# Patient Record
Sex: MALE | Race: WHITE | Employment: FULL TIME | URBAN - METROPOLITAN AREA
[De-identification: names, ages, dates, MRNs, and addresses within clinical notes are randomized per-mention and may not be internally consistent; named-entity substitution may affect disease eponyms.]

---

## 2017-03-29 ENCOUNTER — APPOINTMENT (OUTPATIENT)
Dept: ULTRASOUND IMAGING | Age: 42
End: 2017-03-29
Attending: EMERGENCY MEDICINE
Payer: SELF-PAY

## 2017-03-29 ENCOUNTER — HOSPITAL ENCOUNTER (EMERGENCY)
Age: 42
Discharge: HOME | End: 2017-03-29
Attending: EMERGENCY MEDICINE
Payer: SELF-PAY

## 2017-03-29 VITALS
TEMPERATURE: 98.8 F | HEART RATE: 56 BPM | HEIGHT: 72 IN | WEIGHT: 220 LBS | OXYGEN SATURATION: 100 % | SYSTOLIC BLOOD PRESSURE: 161 MMHG | BODY MASS INDEX: 29.8 KG/M2 | DIASTOLIC BLOOD PRESSURE: 75 MMHG | RESPIRATION RATE: 18 BRPM

## 2017-03-29 DIAGNOSIS — R11.2 NON-INTRACTABLE VOMITING WITH NAUSEA, UNSPECIFIED VOMITING TYPE: Primary | ICD-10-CM

## 2017-03-29 LAB
ALBUMIN SERPL BCP-MCNC: 4.6 G/DL (ref 3.5–5)
ALBUMIN/GLOB SERPL: 1.5 {RATIO} (ref 1.1–2.2)
ALP SERPL-CCNC: 64 U/L (ref 45–117)
ALT SERPL-CCNC: 28 U/L (ref 12–78)
ANION GAP BLD CALC-SCNC: 10 MMOL/L (ref 5–15)
AST SERPL W P-5'-P-CCNC: 16 U/L (ref 15–37)
BASOPHILS # BLD AUTO: 0 K/UL (ref 0–0.1)
BASOPHILS # BLD: 0 % (ref 0–1)
BILIRUB SERPL-MCNC: 0.8 MG/DL (ref 0.2–1)
BUN SERPL-MCNC: 21 MG/DL (ref 6–20)
BUN/CREAT SERPL: 18 (ref 12–20)
CALCIUM SERPL-MCNC: 9.4 MG/DL (ref 8.5–10.1)
CHLORIDE SERPL-SCNC: 105 MMOL/L (ref 97–108)
CO2 SERPL-SCNC: 26 MMOL/L (ref 21–32)
CREAT SERPL-MCNC: 1.18 MG/DL (ref 0.7–1.3)
EOSINOPHIL # BLD: 0 K/UL (ref 0–0.4)
EOSINOPHIL NFR BLD: 0 % (ref 0–7)
ERYTHROCYTE [DISTWIDTH] IN BLOOD BY AUTOMATED COUNT: 12.5 % (ref 11.5–14.5)
GLOBULIN SER CALC-MCNC: 3.1 G/DL (ref 2–4)
GLUCOSE SERPL-MCNC: 137 MG/DL (ref 65–100)
HCT VFR BLD AUTO: 44.9 % (ref 36.6–50.3)
HGB BLD-MCNC: 15.3 G/DL (ref 12.1–17)
LIPASE SERPL-CCNC: 142 U/L (ref 73–393)
LYMPHOCYTES # BLD AUTO: 10 % (ref 12–49)
LYMPHOCYTES # BLD: 1 K/UL (ref 0.8–3.5)
MCH RBC QN AUTO: 30.5 PG (ref 26–34)
MCHC RBC AUTO-ENTMCNC: 34.1 G/DL (ref 30–36.5)
MCV RBC AUTO: 89.4 FL (ref 80–99)
MONOCYTES # BLD: 0.3 K/UL (ref 0–1)
MONOCYTES NFR BLD AUTO: 3 % (ref 5–13)
NEUTS SEG # BLD: 8.5 K/UL (ref 1.8–8)
NEUTS SEG NFR BLD AUTO: 87 % (ref 32–75)
PLATELET # BLD AUTO: 194 K/UL (ref 150–400)
POTASSIUM SERPL-SCNC: 4 MMOL/L (ref 3.5–5.1)
PROT SERPL-MCNC: 7.7 G/DL (ref 6.4–8.2)
RBC # BLD AUTO: 5.02 M/UL (ref 4.1–5.7)
SODIUM SERPL-SCNC: 141 MMOL/L (ref 136–145)
WBC # BLD AUTO: 9.8 K/UL (ref 4.1–11.1)

## 2017-03-29 PROCEDURE — 74011000250 HC RX REV CODE- 250: Performed by: EMERGENCY MEDICINE

## 2017-03-29 PROCEDURE — 36415 COLL VENOUS BLD VENIPUNCTURE: CPT | Performed by: EMERGENCY MEDICINE

## 2017-03-29 PROCEDURE — 74011250637 HC RX REV CODE- 250/637: Performed by: EMERGENCY MEDICINE

## 2017-03-29 PROCEDURE — 96361 HYDRATE IV INFUSION ADD-ON: CPT

## 2017-03-29 PROCEDURE — 74011250636 HC RX REV CODE- 250/636: Performed by: EMERGENCY MEDICINE

## 2017-03-29 PROCEDURE — 85025 COMPLETE CBC W/AUTO DIFF WBC: CPT | Performed by: EMERGENCY MEDICINE

## 2017-03-29 PROCEDURE — 80053 COMPREHEN METABOLIC PANEL: CPT | Performed by: EMERGENCY MEDICINE

## 2017-03-29 PROCEDURE — 83690 ASSAY OF LIPASE: CPT | Performed by: EMERGENCY MEDICINE

## 2017-03-29 PROCEDURE — 76705 ECHO EXAM OF ABDOMEN: CPT

## 2017-03-29 PROCEDURE — 99283 EMERGENCY DEPT VISIT LOW MDM: CPT

## 2017-03-29 PROCEDURE — 96374 THER/PROPH/DIAG INJ IV PUSH: CPT

## 2017-03-29 RX ORDER — FAMOTIDINE 20 MG/1
20 TABLET, FILM COATED ORAL 2 TIMES DAILY
Qty: 20 TAB | Refills: 0 | Status: SHIPPED | OUTPATIENT
Start: 2017-03-29 | End: 2017-05-25

## 2017-03-29 RX ORDER — MAG HYDROX/ALUMINUM HYD/SIMETH 200-200-20
30 SUSPENSION, ORAL (FINAL DOSE FORM) ORAL
Qty: 354 ML | Refills: 0 | Status: SHIPPED | OUTPATIENT
Start: 2017-03-29

## 2017-03-29 RX ORDER — ONDANSETRON 4 MG/1
4 TABLET, FILM COATED ORAL
Qty: 20 TAB | Refills: 0 | Status: SHIPPED | OUTPATIENT
Start: 2017-03-29 | End: 2017-03-29

## 2017-03-29 RX ORDER — ONDANSETRON 4 MG/1
4 TABLET, FILM COATED ORAL
Qty: 20 TAB | Refills: 0 | Status: SHIPPED | OUTPATIENT
Start: 2017-03-29 | End: 2017-05-25

## 2017-03-29 RX ORDER — MAG HYDROX/ALUMINUM HYD/SIMETH 200-200-20
30 SUSPENSION, ORAL (FINAL DOSE FORM) ORAL
Qty: 354 ML | Refills: 0 | Status: SHIPPED | OUTPATIENT
Start: 2017-03-29 | End: 2017-03-29

## 2017-03-29 RX ORDER — ONDANSETRON 2 MG/ML
4 INJECTION INTRAMUSCULAR; INTRAVENOUS
Status: COMPLETED | OUTPATIENT
Start: 2017-03-29 | End: 2017-03-29

## 2017-03-29 RX ADMIN — ONDANSETRON HYDROCHLORIDE 4 MG: 2 INJECTION, SOLUTION INTRAMUSCULAR; INTRAVENOUS at 10:07

## 2017-03-29 RX ADMIN — LIDOCAINE HYDROCHLORIDE 40 ML: 20 SOLUTION ORAL; TOPICAL at 10:07

## 2017-03-29 RX ADMIN — SODIUM CHLORIDE 1000 ML: 900 INJECTION, SOLUTION INTRAVENOUS at 10:07

## 2017-03-29 NOTE — PROGRESS NOTES
RRAT:4    CM met pt at bedside to conduct initial assessment. Pt wife was present at bedside. Pt came into ED with c/o nausea and vomiting. CM confirmed pt's address and phone number with pt. Pt is employed at [employer]. Pt reports living with wife. Pt has transportation at discharge. Pt reported to just moving so he has not set up a pharmacy where he picks up his medications from. Pt and pt's wife were receptive to [facility] application and PCP list. Pt is self pay. CM encourages the pt to call the financial aid number first to get set up with [facility]. Pt and pt's wife do not have any concerns at this time. Care Management Interventions  PCP Verified by CM: Yes  Mode of Transport at Discharge: Self  Transition of Care Consult (CM Consult): Discharge Planning  Physical Therapy Consult: No  Occupational Therapy Consult: No  Speech Therapy Consult: No  Current Support Network:  Other (Pt reports living with \"my wife\")  Confirm Follow Up Transport: Self  Plan discussed with Pt/Family/Caregiver: Yes  Discharge Location  Discharge Placement: Home with outpatient services       [facility]

## 2017-03-29 NOTE — DISCHARGE INSTRUCTIONS
Local Primary Care Physicians  [facility] [phone]  MD Barbie England MD Merita Burke, MD [facility] [phone]  Alexis Favre, [facility]  MD John Schwab MD Adelina Dotter, MD [address]  [phone]  MD Drea Fontanez MD [address] [facility] [phone]  Claude Combe, MD Wicho Russell, MD Sahara Peñaloza MD   [facility] [phone]  [facility] JULIANO BUTLER, MD Danyell Pacheco, MD Melanie Luo, NP [address] [phone]  MD Alejandra Lind MD Gregor Jain, MD Paulina Goode, MD Tobi Carson, MD Lakesha Oropeaz MD   33 57 [facility]  Cat Mccurdy MD [facility] [phone]  MD Lolis Meneses, NP  Natalia Zapata, MD Erroll Gaucher, MD Cinthya Ventura, MD Suresh Mcclure MD   69[address] [facility] [phone]  MD Alexia Reyna, FNP  Michaelene Quiet, NP  Elana See, MD Ebony Martines MD Zelda Haggard, MD [facility] [phone]  MD Jaxon Melgar MD Rosanne Shropshire, MD Jorge A Chen MD   [address] [phone]  MD Meggan Manrique MD [place] [phone]  MD Pedro Pablo Herrera MD Donette Beards, MD   [facility] [phone]  Chrystal Paget, MD Padma Soria, MD Caren Bateman, MD Radu Pérez, MD Guillermina Walker, MD Zohreh Espino, NP  Ana Paula Burr MD [address]   [phone]  MD Minh Villaseñor, MD Sunil Reddy MD   [address] [phone]  Miky Elliott, MD Elizabeth Moreno, STEPHANIE Amaro, KATLYN Amaro, STEPHANIE Menjivar, MD Lyubov Del Rosario NP Vivi Hands, DO             Miscellaneous:  Silver Muñoz MD [facility] Dep[facility]     For adult and child immunizations, family planning, TB screening, STD testing and women's health services. Isaac: Doyle [phone]      [employer] [address]   [address]   [address]   170 [facility]: [facility] 200 [facility] [phone]      [address] [facility]          [address] [address]     For primary care services, woman and child wellness, and some clinics providing specialty care. [facility] [address] [facility] [phone]   [facility] 200 [facility] [phone]   339 [facility] [address] [phone] [address] [facility] [address] [place] [address] [facility] [phone]   7708 [facility] [phone]   [facility] 81 [facility] [phone]   [facility] [address] [place] [address] [phone]   [facility]: [facility] [address], [facility] 79 [facility] [phone]     Taminatalya Diallo [address] [facility] [address] [address] [phone]   [facility] [address] [facility] [phone]   [employer]  [address], [address] ([email or web address]) [phone]         Sexual Health/Woman Wellness Clinics    For STD/HIV testing and treatment, pregnancy testing and services, men's health, birth control services, LGBT services, and hepatitis/HPV vaccine services. [employer] [address] [facility] 15[address]  [address] [phone]   [address] [place] [address] Rd, 5th floor [phone]   [facility] [place] [facility] 59 [facility] for Women [address] [phone]         [place] [address] [facility] [address]   [address] [place] [address]   [phone]   Women, Infant and Children's Services: [address] [phone]       [address] [phone]   [facility]   [phone]   [address] 9964 [facility] Psychiat[facility]     [phone]   [address] Crisis   [phone]   [facility] [phone]          Nausea and Vomiting: Care Instructions  Your Care Instructions    When you are nauseated, you may feel weak and sweaty and notice a lot of saliva in your mouth. Nausea often leads to vomiting. Most of the time you do not need to worry about nausea and vomiting, but they can be signs of other illnesses. Two common causes of nausea and vomiting are stomach flu and food poisoning. Nausea and vomiting from viral stomach flu will usually start to improve within 24 hours. Nausea and vomiting from food poisoning may last from 12 to 48 hours. The doctor has checked you carefully, but problems can develop later. If you notice any problems or new symptoms, get medical treatment right away. Follow-up care is a key part of your treatment and safety. Be sure to make and go to all appointments, and call your doctor if you are having problems. It's also a good idea to know your test results and keep a list of the medicines you take. How can you care for yourself at home? · To prevent dehydration, drink plenty of fluids, enough so that your urine is light yellow or clear like water. Choose water and other caffeine-free clear liquids until you feel better.  If you have kidney, heart, or liver disease and have to limit fluids, talk with your doctor before you increase the amount of fluids you drink.  · Rest in bed until you feel better. · When you are able to eat, try clear soups, mild foods, and liquids until all symptoms are gone for 12 to 48 hours. Other good choices include dry toast, crackers, cooked cereal, and gelatin dessert, such as Jell-O. When should you call for help? Call 911 anytime you think you may need emergency care. For example, call if:  · You passed out (lost consciousness). Call your doctor now or seek immediate medical care if:  · You have symptoms of dehydration, such as:  ¨ Dry eyes and a dry mouth. ¨ Passing only a little dark urine. ¨ Feeling thirstier than usual.  · You have new or worsening belly pain. · You have a new or higher fever. · You vomit blood or what looks like coffee grounds. Watch closely for changes in your health, and be sure to contact your doctor if:  · You have ongoing nausea and vomiting. · Your vomiting is getting worse. · Your vomiting lasts longer than 2 days. · You are not getting better as expected. Where can you learn more? Go to http://zohra-jim.info/. Enter 25 [id number] in the search box to learn more about \"Nausea and Vomiting: Care Instructions. \"  Current as of: May 27, 2016  Content Version: 11.2  © 0258-1217 [employer]. Care instructions adapted under license by [employer] (which disclaims liability or warranty for this information). If you have questions about a medical condition or this instruction, always ask your healthcare professional. [place] [address] any warranty or liability for your use of this information.

## 2017-03-29 NOTE — ED PROVIDER NOTES
HPI Comments: Riya Yanes, 37 y.o. Male with PMHx significant for GERD, presents ambulatory to [id number] [place] [address] ED with cc of 7/10 progressively worsening cramping non-radiating upper abdominal pain x 2 days. The patient also c/o associated nausea and vomiting. The patient notes that he has been unable to tolerate PO. The patient states that he has a history of similar symptoms, and he was evaluated in the ED a few months ago and had a CT with no significant findings. The patient also notes that he was evaluated by GI with no significant findings. The pt states that he takes Nexium daily, which has provided no relief. The patient states that he has had recent sick contact. The patient denies a history of an EGD, taking medications PTA, recently eating spicy foods, recent dietary changes, or history of an ultrasound. The patient specifically denies constipation, diarrhea, fevers, rhinorrhea, eye drainage, chest pain, SOB, cough, or generalized body aches at this time. PCP: None    Social history significant for: - Tobacco, + EtOH, - Illicit Drug Use    There are no other complaints, changes, or physical findings at this time. Written by BRIAN Garcia, as dictated by Meaghan Anglin MD.      The history is provided by the patient. No  was used. Past Medical History:   Diagnosis Date    GERD (gastroesophageal reflux disease)        No past surgical history on file. No family history on file. Social History     Social History    Marital status:      Spouse name: N/A    Number of children: N/A    Years of education: N/A     Occupational History    Not on file.      Social History Main Topics    Smoking status: Never Smoker    Smokeless tobacco: Not on file    Alcohol use Yes      Comment: socially    Drug use: No    Sexual activity: Yes     Partners: Female     Other Topics Concern    Not on file     Social History Narrative         ALLERGIES: Review of patient's allergies indicates no known allergies. Review of Systems   Constitutional: Negative for chills and fever. HENT: Negative for rhinorrhea. Eyes: Negative for discharge. Respiratory: Negative for cough and shortness of breath. Cardiovascular: Negative for chest pain. Gastrointestinal: Positive for abdominal pain, nausea and vomiting. Negative for constipation and diarrhea. Musculoskeletal: Negative for myalgias. Neurological: Negative for weakness and numbness. All other systems reviewed and are negative. Vitals:    03/29/17 0938 03/29/17 0939 03/29/17 1253   BP: 161/75 161/75    Pulse:  (!) 56    Resp:  18    Temp:  98.8 °F (37.1 °C)    SpO2:  100% 100%   Weight:  99.8 kg (220 lb)    Height:  6' (1.829 m)             Physical Exam   Constitutional: He is oriented to person, place, and time. He appears well-developed and well-nourished. HENT:   Head: Normocephalic and atraumatic. Eyes: Conjunctivae and EOM are normal.   Neck: Normal range of motion. Neck supple. Cardiovascular: Normal rate and regular rhythm. Pulmonary/Chest: Effort normal and breath sounds normal. No respiratory distress. Abdominal: Soft. He exhibits no distension. There is tenderness in the epigastric area. Musculoskeletal: Normal range of motion. Neurological: He is alert and oriented to person, place, and time. Skin: Skin is warm and dry. Psychiatric: He has a normal mood and affect. Nursing note and vitals reviewed. Written by BRIAN Bonilla, as dictated by Eirc Braga MD.    MDM  Number of Diagnoses or Management Options  Non-intractable vomiting with nausea, unspecified vomiting type:   Diagnosis management comments: Patient presents with epigastric abdominal pain. Differential includes gastritis, pancreatitis cholelithiasis, cholecystitis, hepatitis, muscular strain, renal pathology, gastroenteritis. Less likely ACS. Will obtain labs and possibly US.  Will give fluids, analgesics and antiemetics PRN. Amount and/or Complexity of Data Reviewed  Clinical lab tests: ordered and reviewed  Tests in the radiology section of CPT®: ordered and reviewed  Review and summarize past medical records: yes    Patient Progress  Patient progress: stable    ED Course       Procedures     Progress Note:  11:33 AM  The patient was re-evaluated and reports that his nausea has completely resolved. Written by BRIAN Martinez, as dictated by Dori Vang MD.    Progress Note:  12:00 PM  The patient was updated on his lab results. Informed the patient that he will be receiving an ultrasound, and he is in agreement with the plan. Written by BRIAN Martinez, as dictated by Dori Vang MD.    Progress Note:  12:50 PM  The patient's imaging results have been discussed with him, and he conveys his understanding. Will discharge with follow up with GI. Written by BRIAN Martinez, as dictated by Dori Vang MD.    LABORATORY TESTS:  Recent Results (from the past 12 hour(s))   CBC WITH AUTOMATED DIFF    Collection Time: 03/29/17  9:52 AM   Result Value Ref Range    WBC 9.8 4.1 - 11.1 K/uL    RBC 5.02 4.10 - 5.70 M/uL    HGB 15.3 12.1 - 17.0 g/dL    HCT 44.9 36.6 - 50.3 %    MCV 89.4 80.0 - 99.0 FL    MCH 30.5 26.0 - 34.0 PG    MCHC 34.1 30.0 - 36.5 g/dL    RDW 12.5 11.5 - 14.5 %    PLATELET 794 749 - 963 K/uL    NEUTROPHILS 87 (H) 32 - 75 %    LYMPHOCYTES 10 (L) 12 - 49 %    MONOCYTES 3 (L) 5 - 13 %    EOSINOPHILS 0 0 - 7 %    BASOPHILS 0 0 - 1 %    ABS. NEUTROPHILS 8.5 (H) 1.8 - 8.0 K/UL    ABS. LYMPHOCYTES 1.0 0.8 - 3.5 K/UL    ABS. MONOCYTES 0.3 0.0 - 1.0 K/UL    ABS. EOSINOPHILS 0.0 0.0 - 0.4 K/UL    ABS.  BASOPHILS 0.0 0.0 - 0.1 K/UL   LIPASE    Collection Time: 03/29/17  9:52 AM   Result Value Ref Range    Lipase 142 73 - 720 U/L   METABOLIC PANEL, COMPREHENSIVE    Collection Time: 03/29/17  9:52 AM   Result Value Ref Range    Sodium 141 136 - 145 mmol/L    Potassium 4.0 3.5 - 5.1 mmol/L    Chloride 105 97 - 108 mmol/L    CO2 26 21 - 32 mmol/L    Anion gap 10 5 - 15 mmol/L    Glucose 137 (H) 65 - 100 mg/dL    BUN 21 (H) 6 - 20 MG/DL    Creatinine 1.18 0.70 - 1.30 MG/DL    BUN/Creatinine ratio 18 12 - 20      GFR est AA >60 >60 ml/min/1.73m2    GFR est non-AA >60 >60 ml/min/1.73m2    Calcium 9.4 8.5 - 10.1 MG/DL    Bilirubin, total 0.8 0.2 - 1.0 MG/DL    ALT (SGPT) 28 12 - 78 U/L    AST (SGOT) 16 15 - 37 U/L    Alk. phosphatase 64 45 - 117 U/L    Protein, total 7.7 6.4 - 8.2 g/dL    Albumin 4.6 3.5 - 5.0 g/dL    Globulin 3.1 2.0 - 4.0 g/dL    A-G Ratio 1.5 1.1 - 2.2       IMAGING RESULTS:  US ABD LTD:    Indication: Right upper quadrant pain     Real-time sonographic imaging of the right upper quadrant was performed. The  liver is normal in appearance without evidence of mass lesion or biliary  dilatation. The gallbladder is unremarkable. Common bile duct is normal in size. The right kidney is normal in size and appearance without evidence of mass  lesion, hydronephrosis, or calcification. The visualized portion of the head and  body of the pancreas is unremarkable. No free fluid.     IMPRESSION  Impression: Normal exam.    MEDICATIONS GIVEN:  Medications   ondansetron (ZOFRAN) injection 4 mg (4 mg IntraVENous Given 3/29/17 1007)   sodium chloride 0.9 % bolus infusion 1,000 mL (1,000 mL IntraVENous New Bag 3/29/17 1007)   mylanta/viscous lidocaine (NATALIA)(GI COCKTAIL) (40 mL Oral Given 3/29/17 1007)       IMPRESSION:  1. Non-intractable vomiting with nausea, unspecified vomiting type        PLAN:  1. Current Discharge Medication List      START taking these medications    Details   alum-mag hydroxide-simeth (MAALOX ADVANCED) 200-200-20 mg/5 mL susp Take 30 mL by mouth four (4) times daily as needed. Qty: 354 mL, Refills: 0      famotidine (PEPCID) 20 mg tablet Take 1 Tab by mouth two (2) times a day.   Qty: 20 Tab, Refills: 0         CONTINUE these medications which have CHANGED Details   ondansetron hcl (ZOFRAN, AS HYDROCHLORIDE,) 4 mg tablet Take 1 Tab by mouth every eight (8) hours as needed for Nausea. Qty: 20 Tab, Refills: 0           2. Follow-up Information     Follow up With Details Comments Contact Info    [facility] EMERGENCY DEPT  If symptoms worsen [address] [place] [address]  [phone]        Return to ED if worse     DISCHARGE NOTE  12:37 PM  The patient has been re-evaluated and is ready for discharge. Reviewed available results with patient. Counseled patient on diagnosis and care plan. Patient has expressed understanding, and all questions have been answered. Patient agrees with plan and agrees to follow up as recommended, or return to the ED if their symptoms worsen. Discharge instructions have been provided and explained to the patient, along with reasons to return to the ED. ATTESTATION:  This note is prepared by Lucas Baum, acting as Scribe for Sharyn Richards M.D. Sharyn Richards M.D: The scribe's documentation has been prepared under my direction and personally reviewed by me in its entirety. I confirm that the note above accurately reflects all work, treatment, procedures, and medical decision making performed by me.

## 2017-03-29 NOTE — ROUTINE PROCESS
The ED Physician reviewed discharge instructions with the patient. The patient verbalized understanding.

## 2017-05-20 ENCOUNTER — HOSPITAL ENCOUNTER (EMERGENCY)
Age: 42
Discharge: HOME | End: 2017-05-20
Attending: EMERGENCY MEDICINE
Payer: SELF-PAY

## 2017-05-20 ENCOUNTER — APPOINTMENT (OUTPATIENT)
Dept: GENERAL RADIOLOGY | Age: 42
End: 2017-05-20
Attending: EMERGENCY MEDICINE
Payer: SELF-PAY

## 2017-05-20 VITALS
HEIGHT: 72 IN | RESPIRATION RATE: 19 BRPM | BODY MASS INDEX: 30 KG/M2 | SYSTOLIC BLOOD PRESSURE: 146 MMHG | TEMPERATURE: 98.7 F | WEIGHT: 221.5 LBS | HEART RATE: 55 BPM | OXYGEN SATURATION: 98 % | DIASTOLIC BLOOD PRESSURE: 74 MMHG

## 2017-05-20 DIAGNOSIS — K31.84 GASTROPARESIS: Primary | ICD-10-CM

## 2017-05-20 LAB
ALBUMIN SERPL BCP-MCNC: 4.2 G/DL (ref 3.5–5)
ALBUMIN/GLOB SERPL: 1.3 {RATIO} (ref 1.1–2.2)
ALP SERPL-CCNC: 62 U/L (ref 45–117)
ALT SERPL-CCNC: 21 U/L (ref 12–78)
AMORPH CRY URNS QL MICRO: ABNORMAL
ANION GAP BLD CALC-SCNC: 7 MMOL/L (ref 5–15)
APPEARANCE UR: ABNORMAL
AST SERPL W P-5'-P-CCNC: 13 U/L (ref 15–37)
BACTERIA URNS QL MICRO: NEGATIVE /HPF
BASOPHILS # BLD AUTO: 0 K/UL (ref 0–0.1)
BASOPHILS # BLD: 0 % (ref 0–1)
BILIRUB SERPL-MCNC: 0.6 MG/DL (ref 0.2–1)
BILIRUB UR QL: NEGATIVE
BUN SERPL-MCNC: 14 MG/DL (ref 6–20)
BUN/CREAT SERPL: 13 (ref 12–20)
CALCIUM SERPL-MCNC: 9.4 MG/DL (ref 8.5–10.1)
CHLORIDE SERPL-SCNC: 108 MMOL/L (ref 97–108)
CO2 SERPL-SCNC: 24 MMOL/L (ref 21–32)
COLOR UR: ABNORMAL
CREAT SERPL-MCNC: 1.1 MG/DL (ref 0.7–1.3)
DIFFERENTIAL METHOD BLD: ABNORMAL
EOSINOPHIL # BLD: 0 K/UL (ref 0–0.4)
EOSINOPHIL NFR BLD: 0 % (ref 0–7)
EPITH CASTS URNS QL MICRO: ABNORMAL /LPF
ERYTHROCYTE [DISTWIDTH] IN BLOOD BY AUTOMATED COUNT: 12.2 % (ref 11.5–14.5)
GLOBULIN SER CALC-MCNC: 3.2 G/DL (ref 2–4)
GLUCOSE SERPL-MCNC: 150 MG/DL (ref 65–100)
GLUCOSE UR STRIP.AUTO-MCNC: NEGATIVE MG/DL
HCT VFR BLD AUTO: 44.7 % (ref 36.6–50.3)
HGB BLD-MCNC: 15.5 G/DL (ref 12.1–17)
HGB UR QL STRIP: NEGATIVE
KETONES UR QL STRIP.AUTO: 40 MG/DL
LEUKOCYTE ESTERASE UR QL STRIP.AUTO: NEGATIVE
LIPASE SERPL-CCNC: 117 U/L (ref 73–393)
LYMPHOCYTES # BLD AUTO: 9 % (ref 12–49)
LYMPHOCYTES # BLD: 0.7 K/UL (ref 0.8–3.5)
MCH RBC QN AUTO: 30.6 PG (ref 26–34)
MCHC RBC AUTO-ENTMCNC: 34.7 G/DL (ref 30–36.5)
MCV RBC AUTO: 88.3 FL (ref 80–99)
MONOCYTES # BLD: 0.2 K/UL (ref 0–1)
MONOCYTES NFR BLD AUTO: 2 % (ref 5–13)
NEUTS SEG # BLD: 6.8 K/UL (ref 1.8–8)
NEUTS SEG NFR BLD AUTO: 89 % (ref 32–75)
NITRITE UR QL STRIP.AUTO: NEGATIVE
PH UR STRIP: 8 [PH] (ref 5–8)
PLATELET # BLD AUTO: 212 K/UL (ref 150–400)
POTASSIUM SERPL-SCNC: 4.1 MMOL/L (ref 3.5–5.1)
PROT SERPL-MCNC: 7.4 G/DL (ref 6.4–8.2)
PROT UR STRIP-MCNC: ABNORMAL MG/DL
RBC # BLD AUTO: 5.06 M/UL (ref 4.1–5.7)
RBC #/AREA URNS HPF: ABNORMAL /HPF (ref 0–5)
RBC MORPH BLD: ABNORMAL
RBC MORPH BLD: ABNORMAL
SODIUM SERPL-SCNC: 139 MMOL/L (ref 136–145)
SP GR UR REFRACTOMETRY: 1.03 (ref 1–1.03)
UA: UC IF INDICATED,UAUC: ABNORMAL
UROBILINOGEN UR QL STRIP.AUTO: 0.2 EU/DL (ref 0.2–1)
WBC # BLD AUTO: 7.7 K/UL (ref 4.1–11.1)
WBC URNS QL MICRO: ABNORMAL /HPF (ref 0–4)

## 2017-05-20 PROCEDURE — 85025 COMPLETE CBC W/AUTO DIFF WBC: CPT | Performed by: EMERGENCY MEDICINE

## 2017-05-20 PROCEDURE — 96374 THER/PROPH/DIAG INJ IV PUSH: CPT

## 2017-05-20 PROCEDURE — 81001 URINALYSIS AUTO W/SCOPE: CPT | Performed by: EMERGENCY MEDICINE

## 2017-05-20 PROCEDURE — 74011250636 HC RX REV CODE- 250/636: Performed by: EMERGENCY MEDICINE

## 2017-05-20 PROCEDURE — C9113 INJ PANTOPRAZOLE SODIUM, VIA: HCPCS | Performed by: EMERGENCY MEDICINE

## 2017-05-20 PROCEDURE — 36415 COLL VENOUS BLD VENIPUNCTURE: CPT | Performed by: EMERGENCY MEDICINE

## 2017-05-20 PROCEDURE — 96361 HYDRATE IV INFUSION ADD-ON: CPT

## 2017-05-20 PROCEDURE — 83690 ASSAY OF LIPASE: CPT | Performed by: EMERGENCY MEDICINE

## 2017-05-20 PROCEDURE — 96375 TX/PRO/DX INJ NEW DRUG ADDON: CPT

## 2017-05-20 PROCEDURE — 74022 RADEX COMPL AQT ABD SERIES: CPT

## 2017-05-20 PROCEDURE — 80053 COMPREHEN METABOLIC PANEL: CPT | Performed by: EMERGENCY MEDICINE

## 2017-05-20 PROCEDURE — 99285 EMERGENCY DEPT VISIT HI MDM: CPT

## 2017-05-20 RX ORDER — DICYCLOMINE HYDROCHLORIDE 20 MG/1
20 TABLET ORAL EVERY 6 HOURS
Qty: 20 TAB | Refills: 0 | Status: SHIPPED | OUTPATIENT
Start: 2017-05-20 | End: 2017-05-25

## 2017-05-20 RX ORDER — METOCLOPRAMIDE 10 MG/1
10 TABLET ORAL
Qty: 12 TAB | Refills: 0 | Status: SHIPPED | OUTPATIENT
Start: 2017-05-20 | End: 2017-05-20

## 2017-05-20 RX ORDER — ONDANSETRON 2 MG/ML
8 INJECTION INTRAMUSCULAR; INTRAVENOUS
Status: COMPLETED | OUTPATIENT
Start: 2017-05-20 | End: 2017-05-20

## 2017-05-20 RX ORDER — METOCLOPRAMIDE 10 MG/1
10 TABLET ORAL
Qty: 12 TAB | Refills: 0 | Status: SHIPPED | OUTPATIENT
Start: 2017-05-20 | End: 2017-05-25

## 2017-05-20 RX ORDER — PANTOPRAZOLE SODIUM 40 MG/10ML
40 INJECTION, POWDER, LYOPHILIZED, FOR SOLUTION INTRAVENOUS
Status: COMPLETED | OUTPATIENT
Start: 2017-05-20 | End: 2017-05-20

## 2017-05-20 RX ORDER — DICYCLOMINE HYDROCHLORIDE 20 MG/1
20 TABLET ORAL EVERY 6 HOURS
Qty: 20 TAB | Refills: 0 | Status: SHIPPED | OUTPATIENT
Start: 2017-05-20 | End: 2017-05-20

## 2017-05-20 RX ADMIN — ONDANSETRON 8 MG: 2 INJECTION INTRAMUSCULAR; INTRAVENOUS at 13:34

## 2017-05-20 RX ADMIN — SODIUM CHLORIDE 1000 ML: 900 INJECTION, SOLUTION INTRAVENOUS at 13:34

## 2017-05-20 RX ADMIN — PANTOPRAZOLE SODIUM 40 MG: 40 INJECTION, POWDER, FOR SOLUTION INTRAVENOUS at 14:20

## 2017-05-20 NOTE — ED TRIAGE NOTES
Pt presents with complaints of abdominal pain, nausea/vomiting since he woke this morning. Pt states that he has had these symptoms before and was diagnosed with reflux and put on nexium which has not helped him. Pt heaving during triage assessment.

## 2017-05-20 NOTE — DISCHARGE INSTRUCTIONS
We hope that we have addressed all of your medical concerns. The examination and treatment you received in the Emergency Department were for an emergent problem and were not intended as complete care. It is important that you follow up with your healthcare provider(s) for ongoing care. If your symptoms worsen or do not improve as expected, and you are unable to reach your usual health care provider(s), you should return to the Emergency Department. Today's healthcare is undergoing tremendous change, and patient satisfaction surveys are one of the many tools to assess the quality of medical care. You may receive a survey from the [employer] regarding your experience in the Emergency Department. I hope that your experience has been completely positive, particularly the medical care that I provided. As such, please participate in the survey; anything less than excellent does not meet my expectations or intentions. [facility] and [facility] participate in nationally recognized quality of care measures. If your blood pressure is greater than 120/80, as reported below, we urge that you seek medical care to address the potential of high blood pressure, commonly known as hypertension. Hypertension can be hereditary or can be caused by certain medical conditions, pain, stress, or \"white coat syndrome. \"       Please make an appointment with your health care provider(s) for follow up of your Emergency Department visit. VITALS:   Patient Vitals for the past 8 hrs:   Temp Pulse Resp BP SpO2   05/20/17 1430 - (!) 50 13 149/77 98 %   05/20/17 1400 - (!) 43 17 125/85 97 %   05/20/17 1330 - 79 23 134/70 98 %   05/20/17 1315 - - - 134/71 99 %   05/20/17 1312 - - - 128/88 98 %   05/20/17 1301 98.7 °F (37.1 °C) - - - -          Thank you for allowing us to provide you with medical care today.   We realize that you have many choices for your emergency care needs. Please choose us in the future for any continued health care needs. Meghana Agosto MD    [address] [facility].   Office: [phone]            Recent Results (from the past 24 hour(s))   CBC WITH AUTOMATED DIFF    Collection Time: 05/20/17  1:28 PM   Result Value Ref Range    WBC 7.7 4.1 - 11.1 K/uL    RBC 5.06 4.10 - 5.70 M/uL    HGB 15.5 12.1 - 17.0 g/dL    HCT 44.7 36.6 - 50.3 %    MCV 88.3 80.0 - 99.0 FL    MCH 30.6 26.0 - 34.0 PG    MCHC 34.7 30.0 - 36.5 g/dL    RDW 12.2 11.5 - 14.5 %    PLATELET 477 559 - 568 K/uL    NEUTROPHILS 89 (H) 32 - 75 %    LYMPHOCYTES 9 (L) 12 - 49 %    MONOCYTES 2 (L) 5 - 13 %    EOSINOPHILS 0 0 - 7 %    BASOPHILS 0 0 - 1 %    ABS. NEUTROPHILS 6.8 1.8 - 8.0 K/UL    ABS. LYMPHOCYTES 0.7 (L) 0.8 - 3.5 K/UL    ABS. MONOCYTES 0.2 0.0 - 1.0 K/UL    ABS. EOSINOPHILS 0.0 0.0 - 0.4 K/UL    ABS. BASOPHILS 0.0 0.0 - 0.1 K/UL    DF SMEAR SCANNED      RBC COMMENTS NORMOCYTIC, NORMOCHROMIC      RBC COMMENTS OVALOCYTES  PRESENT       METABOLIC PANEL, COMPREHENSIVE    Collection Time: 05/20/17  1:28 PM   Result Value Ref Range    Sodium 139 136 - 145 mmol/L    Potassium 4.1 3.5 - 5.1 mmol/L    Chloride 108 97 - 108 mmol/L    CO2 24 21 - 32 mmol/L    Anion gap 7 5 - 15 mmol/L    Glucose 150 (H) 65 - 100 mg/dL    BUN 14 6 - 20 MG/DL    Creatinine 1.10 0.70 - 1.30 MG/DL    BUN/Creatinine ratio 13 12 - 20      GFR est AA >60 >60 ml/min/1.73m2    GFR est non-AA >60 >60 ml/min/1.73m2    Calcium 9.4 8.5 - 10.1 MG/DL    Bilirubin, total 0.6 0.2 - 1.0 MG/DL    ALT (SGPT) 21 12 - 78 U/L    AST (SGOT) 13 (L) 15 - 37 U/L    Alk.  phosphatase 62 45 - 117 U/L    Protein, total 7.4 6.4 - 8.2 g/dL    Albumin 4.2 3.5 - 5.0 g/dL    Globulin 3.2 2.0 - 4.0 g/dL    A-G Ratio 1.3 1.1 - 2.2     LIPASE    Collection Time: 05/20/17  1:28 PM   Result Value Ref Range    Lipase 117 73 - 393 U/L   URINALYSIS W/ REFLEX CULTURE    Collection Time: 05/20/17  1:28 PM   Result Value Ref Range    Color YELLOW/STRAW      Appearance TURBID (A) CLEAR      Specific gravity 1.029 1.003 - 1.030      pH (UA) 8.0 5.0 - 8.0      Protein TRACE (A) NEG mg/dL    Glucose NEGATIVE  NEG mg/dL    Ketone 40 (A) NEG mg/dL    Bilirubin NEGATIVE  NEG      Blood NEGATIVE  NEG      Urobilinogen 0.2 0.2 - 1.0 EU/dL    Nitrites NEGATIVE  NEG      Leukocyte Esterase NEGATIVE  NEG      WBC 0-4 0 - 4 /hpf    RBC 0-5 0 - 5 /hpf    Epithelial cells MODERATE (A) FEW /lpf    Bacteria NEGATIVE  NEG /hpf    UA:UC IF INDICATED CULTURE NOT INDICATED BY UA RESULT CNI      Amorphous Crystals 2+ (A) NEG       Xr Abd Acute W 1 V Chest    Result Date: 5/20/2017  EXAM:  XR ABD ACUTE W 1 V CHEST INDICATION:  Abdominal Pain, nausea, and vomiting today. Nexium is not helping his pain COMPARISON: None. FINDINGS: The upright chest radiograph demonstrates clear lungs and normal cardiac and mediastinal contours. There is no pleural effusion or free air under the diaphragm. Supine and upright views of the abdomen demonstrate a nonobstructive bowel gas pattern. There is no free intraperitoneal air. No soft tissue masses or pathologic calcifications are identified. The bones are within normal limits. IMPRESSION: No acute chest or abdominal process seen            Gastroparesis: Care Instructions  Your Care Instructions    When you have gastroparesis, your stomach takes a lot longer to empty. This delay can cause belly pain, bloating, and belching. It also can cause hiccups, heartburn, nausea or vomiting. You may not feel like eating. These symptoms may come and go. They most often occur during and after meals. You may feel full after only a few bites of food. This condition occurs when the nerves to the stomach don't work properly. Diabetes is the most common cause of this nerve damage. Gastroparesis can make it harder to control your blood sugar levels. But keeping your blood sugar levels under control may help with your symptoms. Parkinson's disease, stroke, and some medicines can also cause this condition. Home treatment can often help. Follow-up care is a key part of your treatment and safety. Be sure to make and go to all appointments, and call your doctor if you are having problems. It's also a good idea to know your test results and keep a list of the medicines you take. How can you care for yourself at home? · Eat several small meals each day rather than three large meals. · Eat foods that are low in fiber and fat. · If your doctor suggests it, take medicines that help the stomach empty more quickly. These are called motility agents. When should you call for help? Call your doctor now or seek immediate medical care if:  · You have new or worse belly pain. · Your belly pain lasts longer than 24 hours and is not getting better. Watch closely for changes in your health, and be sure to contact your doctor if:  · You have digestive problems that get worse or occur more often. · You are losing weight. Where can you learn more? Go to http://zohra-jim.info/. Enter M106 in the search box to learn more about \"Gastroparesis: Care Instructions. \"  Current as of: August 9, 2016  Content Version: 11.2  © 7255-8114 [employer]. Care instructions adapted under license by [employer] (which disclaims liability or warranty for this information). If you have questions about a medical condition or this instruction, always ask your healthcare professional. [address] any warranty or liability for your use of this information.

## 2017-05-20 NOTE — ED NOTES
Pt tolerated ginger ale and crackers well. No nausea or vomiting reported or noted. Discharge instructions given to pt. All questions answered and pt verbalized understanding. V/S stable @ time of discharge. Pt ambulatory out of unit.

## 2017-05-20 NOTE — ED PROVIDER NOTES
HPI Comments: The patient is a 70-year-old male with a past medical history significant for GERD, who presents to the ED with a complaint of sudden onset of nausea and vomiting, this morning after waking her from sleep, accompanied by epigastric pain. He described a dull, throbbing discomfort, severity 5/10, without any aggravating or relieving factors and nonradiating. He denies fever, sore throat, cough, congestion, headache, neck pain, back pain, chest pain or shortness of breath, diarrhea, constipation, dysuria, hematuria, dizziness, weakness, numbness, sick contact, unusual food, stenosis, and recent travel, prior abdominal surgery. Patient is a 37 y.o. male presenting with abdominal pain. Abdominal Pain           Past Medical History:   Diagnosis Date    GERD (gastroesophageal reflux disease)        History reviewed. No pertinent surgical history. History reviewed. No pertinent family history. Social History     Social History    Marital status:      Spouse name: N/A    Number of children: N/A    Years of education: N/A     Occupational History    Not on file. Social History Main Topics    Smoking status: Never Smoker    Smokeless tobacco: Not on file    Alcohol use Yes      Comment: socially    Drug use: No    Sexual activity: Yes     Partners: Female     Other Topics Concern    Not on file     Social History Narrative         ALLERGIES: Review of patient's allergies indicates no known allergies. Review of Systems   Gastrointestinal: Positive for abdominal pain. All other systems reviewed and are negative. Vitals:    05/20/17 1301 05/20/17 1312 05/20/17 1315   BP:  128/88 134/71   Temp: 98.7 °F (37.1 °C)     SpO2:  98% 99%   Weight: 100.5 kg (221 lb 8 oz)     Height: 6' (1.829 m)              Physical Exam   Nursing note and vitals reviewed.      CONSTITUTIONAL: Well-appearing; well-nourished; in mild distress  HEAD: Normocephalic; atraumatic  EYES: PERRL; EOM intact; conjunctiva and sclera are clear bilaterally. ENT: No rhinorrhea; normal pharynx with no tonsillar hypertrophy; mucous membranes pink/moist, no erythema, no exudate. NECK: Supple; non-tender; no cervical lymphadenopathy  CARD: Normal S1, S2; no murmurs, rubs, or gallops. Regular rate and rhythm. RESP: Normal respiratory effort; breath sounds clear and equal bilaterally; no wheezes, rhonchi, or rales. ABD: Normal bowel sounds; non-distended; non-tender; no palpable organomegaly, no masses, no bruits. Back Exam: Normal inspection; no vertebral point tenderness, no CVA tenderness. Normal range of motion. EXT: Normal ROM in all four extremities; non-tender to palpation; no swelling or deformity; distal pulses are normal, no edema. SKIN: Warm; dry; no rash. NEURO:Alert and oriented x 3, coherent, JANY-XII grossly intact, sensory and motor are non-focal.        MDM  Number of Diagnoses or Management Options  Gastroparesis:   Diagnosis management comments: Assessment: intractable nausea and vomiting with epigastric discomfort. The patient has a history of the same with the negative findings. Suspect gastroparesis rule out electrolyte abnormality, dehydration, pancreatitis, obstipation, and bowel obstruction. Plan: lab/ IV fluid/ antiemetic/ analgesia/ abdominal series x-ray/ Monitor and Reevaluate.          Amount and/or Complexity of Data Reviewed  Clinical lab tests: ordered and reviewed  Tests in the radiology section of CPT®: ordered and reviewed  Tests in the medicine section of CPT®: reviewed and ordered  Discussion of test results with the performing providers: yes  Decide to obtain previous medical records or to obtain history from someone other than the patient: yes  Obtain history from someone other than the patient: yes  Review and summarize past medical records: yes  Discuss the patient with other providers: yes  Independent visualization of images, tracings, or specimens: yes    Risk of Complications, Morbidity, and/or Mortality  Presenting problems: moderate  Diagnostic procedures: moderate  Management options: moderate      ED Course       Procedures     ABDOMINAL XRAY INTERPRETATION (ED MD)  No free air. No evidence of obstruction. No air-fluid levels. Joann Gonzalez MD  14:37    Progress Note:   Pt has been reexamined by Joann Gonzalez MD. Pt is feeling much better. Symptoms have improved. All available results have been reviewed with pt and any available family. The patient was given p.o. Challenge that he tolerated well. Pt understands sx, dx, and tx in ED. Care plan has been outlined and questions have been answered. Pt is ready to go home. Will send home on gastroparesis instructions. Prescription of Reglan and Bentyl. Outpatient referral with PCP as needed. Written by Joann Gonzalez MD,9:24 PM    .   .

## 2017-05-25 ENCOUNTER — HOSPITAL ENCOUNTER (EMERGENCY)
Age: 42
Discharge: HOME | End: 2017-05-25
Attending: EMERGENCY MEDICINE
Payer: SELF-PAY

## 2017-05-25 VITALS
HEART RATE: 53 BPM | HEIGHT: 72 IN | WEIGHT: 225.53 LBS | RESPIRATION RATE: 22 BRPM | DIASTOLIC BLOOD PRESSURE: 97 MMHG | SYSTOLIC BLOOD PRESSURE: 162 MMHG | TEMPERATURE: 97.5 F | OXYGEN SATURATION: 100 % | BODY MASS INDEX: 30.55 KG/M2

## 2017-05-25 DIAGNOSIS — R11.2 NAUSEA AND VOMITING, INTRACTABILITY OF VOMITING NOT SPECIFIED, UNSPECIFIED VOMITING TYPE: Primary | ICD-10-CM

## 2017-05-25 LAB
ATRIAL RATE: 54 BPM
CALCULATED P AXIS, ECG09: -100 DEGREES
CALCULATED R AXIS, ECG10: -6 DEGREES
CALCULATED T AXIS, ECG11: 21 DEGREES
DIAGNOSIS, 93000: NORMAL
P-R INTERVAL, ECG05: 144 MS
Q-T INTERVAL, ECG07: 442 MS
QRS DURATION, ECG06: 100 MS
QTC CALCULATION (BEZET), ECG08: 419 MS
VENTRICULAR RATE, ECG03: 54 BPM

## 2017-05-25 PROCEDURE — 99284 EMERGENCY DEPT VISIT MOD MDM: CPT

## 2017-05-25 PROCEDURE — 36415 COLL VENOUS BLD VENIPUNCTURE: CPT | Performed by: EMERGENCY MEDICINE

## 2017-05-25 PROCEDURE — 74011000250 HC RX REV CODE- 250: Performed by: EMERGENCY MEDICINE

## 2017-05-25 PROCEDURE — 93005 ELECTROCARDIOGRAM TRACING: CPT

## 2017-05-25 PROCEDURE — 86677 HELICOBACTER PYLORI ANTIBODY: CPT | Performed by: EMERGENCY MEDICINE

## 2017-05-25 PROCEDURE — 96361 HYDRATE IV INFUSION ADD-ON: CPT

## 2017-05-25 PROCEDURE — 96375 TX/PRO/DX INJ NEW DRUG ADDON: CPT

## 2017-05-25 PROCEDURE — 74011250636 HC RX REV CODE- 250/636: Performed by: EMERGENCY MEDICINE

## 2017-05-25 PROCEDURE — 96374 THER/PROPH/DIAG INJ IV PUSH: CPT

## 2017-05-25 RX ORDER — LIDOCAINE HYDROCHLORIDE 20 MG/ML
15 SOLUTION OROPHARYNGEAL AS NEEDED
Qty: 1 BOTTLE | Refills: 0 | Status: SHIPPED | OUTPATIENT
Start: 2017-05-25

## 2017-05-25 RX ORDER — METOCLOPRAMIDE 10 MG/1
10 TABLET ORAL
Qty: 20 TAB | Refills: 0 | Status: SHIPPED | OUTPATIENT
Start: 2017-05-25 | End: 2017-06-04

## 2017-05-25 RX ORDER — HALOPERIDOL 5 MG/ML
10 INJECTION INTRAMUSCULAR
Status: COMPLETED | OUTPATIENT
Start: 2017-05-25 | End: 2017-05-25

## 2017-05-25 RX ORDER — FAMOTIDINE 10 MG/ML
20 INJECTION INTRAVENOUS
Status: COMPLETED | OUTPATIENT
Start: 2017-05-25 | End: 2017-05-25

## 2017-05-25 RX ADMIN — HALOPERIDOL LACTATE 10 MG: 5 INJECTION, SOLUTION INTRAMUSCULAR at 08:21

## 2017-05-25 RX ADMIN — SODIUM CHLORIDE 1000 ML: 900 INJECTION, SOLUTION INTRAVENOUS at 08:21

## 2017-05-25 RX ADMIN — FAMOTIDINE 20 MG: 10 INJECTION, SOLUTION INTRAVENOUS at 08:21

## 2017-05-25 NOTE — DISCHARGE INSTRUCTIONS
Gastroparesis: Care Instructions  Your Care Instructions    When you have gastroparesis, your stomach takes a lot longer to empty. This delay can cause belly pain, bloating, and belching. It also can cause hiccups, heartburn, nausea or vomiting. You may not feel like eating. These symptoms may come and go. They most often occur during and after meals. You may feel full after only a few bites of food. This condition occurs when the nerves to the stomach don't work properly. Diabetes is the most common cause of this nerve damage. Gastroparesis can make it harder to control your blood sugar levels. But keeping your blood sugar levels under control may help with your symptoms. Parkinson's disease, stroke, and some medicines can also cause this condition. Home treatment can often help. Follow-up care is a key part of your treatment and safety. Be sure to make and go to all appointments, and call your doctor if you are having problems. It's also a good idea to know your test results and keep a list of the medicines you take. How can you care for yourself at home? · Eat several small meals each day rather than three large meals. · Eat foods that are low in fiber and fat. · If your doctor suggests it, take medicines that help the stomach empty more quickly. These are called motility agents. When should you call for help? Call your doctor now or seek immediate medical care if:  · You have new or worse belly pain. · Your belly pain lasts longer than 24 hours and is not getting better. Watch closely for changes in your health, and be sure to contact your doctor if:  · You have digestive problems that get worse or occur more often. · You are losing weight. Where can you learn more? Go to http://zohra-jim.info/. Enter M106 in the search box to learn more about \"Gastroparesis: Care Instructions. \"  Current as of: August 9, 2016  Content Version: 11.2  © 7082-9777 [employer]. Care instructions adapted under license by [employer] (which disclaims liability or warranty for this information). If you have questions about a medical condition or this instruction, always ask your healthcare professional. [address] any warranty or liability for your use of this information.

## 2017-05-25 NOTE — ED PROVIDER NOTES
HPI Comments: Tere Rivas is a 37 y.o. male with pertinent PMHx of GERD who presents ambulatory with family to ED c/o intermittent burning, sharp, stabbing abdominal pain for the past year that has become more constant recently, along with associated nausea, vomiting, and chills. Pt has been taking Nexium and Bentyl with no relief. Pt notes he has been to 3 different hospitals this week for these symptoms, and he was diagnosed with gastroparesis. Family states the pt is scheduled to see a GI specialist at 8:15 AM today, but they are unable to afford the follow up. Pt states he has never travelled out of the country, and he does not do any camping, hiking, or fishing. Pt denies any fever, chest pain, shortness of breath, back pain. GI: Dr. Shreya Chang., MD  Social Hx:  tobacco use (-), EtOH use (occasional)    There are no other complaints, changes or physical findings at this time. The history is provided by the patient. No  was used. Past Medical History:   Diagnosis Date    GERD (gastroesophageal reflux disease)        History reviewed. No pertinent surgical history. History reviewed. No pertinent family history. Social History     Social History    Marital status:      Spouse name: N/A    Number of children: N/A    Years of education: N/A     Occupational History    Not on file. Social History Main Topics    Smoking status: Never Smoker    Smokeless tobacco: Not on file    Alcohol use Yes      Comment: socially    Drug use: No    Sexual activity: Yes     Partners: Female     Other Topics Concern    Not on file     Social History Narrative         ALLERGIES: Review of patient's allergies indicates no known allergies. Review of Systems   Constitutional: Negative for activity change, appetite change, chills, fever and unexpected weight change. HENT: Negative for congestion. Eyes: Negative for pain and visual disturbance. Respiratory: Negative for cough and shortness of breath. Cardiovascular: Negative for chest pain. Gastrointestinal: Positive for abdominal pain, nausea and vomiting. Negative for diarrhea. Genitourinary: Negative for dysuria. Musculoskeletal: Negative for back pain. Skin: Negative for rash. Neurological: Negative for headaches. Vitals:    05/25/17 0745 05/25/17 0823   BP: (!) 132/99 (!) 162/97   Pulse: (!) 51 (!) 53   Resp: 16 22   Temp: 97.5 °F (36.4 °C)    SpO2: 100% 100%   Weight: 102.3 kg (225 lb 8.5 oz)    Height: 6' (1.829 m)             Physical Exam   Constitutional: He is oriented to person, place, and time. He appears well-developed and well-nourished. Middle aged male appears frustrated, mild distress. HENT:   Head: Normocephalic and atraumatic. Dry MM     Eyes: Conjunctivae and EOM are normal. Pupils are equal, round, and reactive to light. Right eye exhibits no discharge. Left eye exhibits no discharge. Neck: Normal range of motion. Neck supple. Cardiovascular: Normal rate, regular rhythm and normal heart sounds. No murmur heard. Pulmonary/Chest: Effort normal and breath sounds normal. No respiratory distress. He has no wheezes. He has no rales. Abdominal: Soft. Bowel sounds are normal. He exhibits no distension. There is no tenderness. There is no rebound and no guarding. Musculoskeletal: Normal range of motion. He exhibits no edema. Neurological: He is alert and oriented to person, place, and time. No cranial nerve deficit. He exhibits normal muscle tone. Skin: Skin is warm and dry. No rash noted. He is not diaphoretic. Nursing note and vitals reviewed. MDM  Number of Diagnoses or Management Options  Diagnosis management comments: Continual pain x1 year despite tx. Multiple sets of GI labs sent without evidence of liver, renal abnormality. Exam without evidence of SBO.         Amount and/or Complexity of Data Reviewed  Tests in the medicine section of CPT®: ordered and reviewed  Review and summarize past medical records: yes    Patient Progress  Patient progress: stable    ED Course       Procedures    EKG interpretation: (Preliminary) 8:02 AM  Rhythm: normal sinus rhythm; and regular . Rate (approx.): 54; Axis: normal; P wave: normal; QRS interval: normal ; ST/T wave: normal; V1 and V3 lead reversal. Normal QTc. Unchanged from EKG in August 2016. Written by Tony Mcdonald. Guru Kaur ED Scribe, as dictated by Adan Rodriguez MD.    8:15 AM  Spoke with [facility]'s answering service, who advises that the office does not open until 8:30 AM today. Will call back to the office at that time. 8:34 AM  Spoke with [facility], and they are able to reschedule the patient to see Dr Ez Oakley at 10:00 AM today. Pt denies further vomiting, states he feels better. RX reglan with lidocaine. Discussed pending h pylori, will follow results. LABORATORY TESTS:  Recent Results (from the past 12 hour(s))   EKG, 12 LEAD, INITIAL    Collection Time: 05/25/17  8:02 AM   Result Value Ref Range    Ventricular Rate 54 BPM    Atrial Rate 54 BPM    P-R Interval 144 ms    QRS Duration 100 ms    Q-T Interval 442 ms    QTC Calculation (Bezet) 419 ms    Calculated P Axis -100 degrees    Calculated R Axis -6 degrees    Calculated T Axis 21 degrees    Diagnosis       Unusual P axis, possible ectopic atrial bradycardia  Abnormal ECG  When compared with ECG of 03-AUG-2016 14:46,  Ectopic atrial rhythm has replaced Sinus rhythm           MEDICATIONS GIVEN:  Medications   sodium chloride 0.9 % bolus infusion 1,000 mL (1,000 mL IntraVENous New Bag 5/25/17 0821)   haloperidol lactate (HALDOL) injection 10 mg (10 mg IntraVENous Given 5/25/17 0821)   famotidine (PF) (PEPCID) injection 20 mg (20 mg IntraVENous Given 5/25/17 0821)       IMPRESSION:  1. Nausea and vomiting, intractability of vomiting not specified, unspecified vomiting type        PLAN:  1. Current Discharge Medication List      START taking these medications    Details   lidocaine (XYLOCAINE) 2 % solution Take 15 mL by mouth as needed for Pain. Qty: 1 Bottle, Refills: 0      metoclopramide HCl (REGLAN) 10 mg tablet Take 1 Tab by mouth Before breakfast, lunch, dinner and at bedtime for 10 days. Qty: 20 Tab, Refills: 0           2. Follow-up Information     Follow up With Details Comments Contact Info    [facility] EMERGENCY DEPT  If symptoms worsen [address] [place] [address]  [phone]        Return to ED if worse       DISCHARGE NOTE  8:54 AM  The patient has been re-evaluated and is ready for discharge. Reviewed available results with patient. Counseled pt on diagnosis and care plan. Pt has expressed understanding, and all questions have been answered. Pt agrees with plan and agrees to follow up as recommended, or return to the ED if their symptoms worsen. Discharge instructions have been provided and explained to the pt, along with reasons to return to the ED. Attestations: This note is prepared by lAma Michel. Leo Byrd, acting as Scribe for Karan Black MD.    Karan Black MD: The scribe's documentation has been prepared under my direction and personally reviewed by me in its entirety. I confirm that the note above accurately reflects all work, treatment, procedures, and medical decision making performed by me.

## 2017-05-25 NOTE — ED NOTES
Received pt to exam room, resting on stretcher in position of comfort, call bell given to pt, accompanied by family; pt reports ongoing n/v and abd pain x 1 year, states he has been seen at 3 different hospitals this past week for same with minimal relief, states he has a GI appt at 815 this am at [facility] but doesn't have the money to pay for it

## 2017-05-26 LAB
H PYLORI IGA SER-ACNC: <9 UNITS (ref 0–8.9)
H PYLORI IGG SER IA-ACNC: <0.9 U/ML (ref 0–0.8)

## 2017-05-26 NOTE — ED NOTES
5/26/17 11:40AM  Attempted to call pt regarding h pylori results. HIPAA appropriate VM left with phone number to call back to ER.

## 2021-05-18 ENCOUNTER — APPOINTMENT (OUTPATIENT)
Dept: CT IMAGING | Age: 46
End: 2021-05-18
Attending: PHYSICIAN ASSISTANT

## 2021-05-18 ENCOUNTER — HOSPITAL ENCOUNTER (EMERGENCY)
Age: 46
Discharge: HOME | End: 2021-05-18
Attending: EMERGENCY MEDICINE

## 2021-05-18 VITALS
DIASTOLIC BLOOD PRESSURE: 110 MMHG | SYSTOLIC BLOOD PRESSURE: 156 MMHG | RESPIRATION RATE: 16 BRPM | HEART RATE: 59 BPM | OXYGEN SATURATION: 99 % | TEMPERATURE: 98 F

## 2021-05-18 DIAGNOSIS — R11.2 NON-INTRACTABLE VOMITING WITH NAUSEA, UNSPECIFIED VOMITING TYPE: ICD-10-CM

## 2021-05-18 DIAGNOSIS — M54.6 ACUTE LEFT-SIDED THORACIC BACK PAIN: Primary | ICD-10-CM

## 2021-05-18 LAB
ALBUMIN SERPL-MCNC: 4.3 G/DL (ref 3.5–5)
ALBUMIN/GLOB SERPL: 1.4 {RATIO} (ref 1.1–2.2)
ALP SERPL-CCNC: 66 U/L (ref 45–117)
ALT SERPL-CCNC: 25 U/L (ref 12–78)
ANION GAP SERPL CALC-SCNC: 4 MMOL/L (ref 5–15)
APPEARANCE UR: ABNORMAL
AST SERPL-CCNC: 15 U/L (ref 15–37)
BACTERIA URNS QL MICRO: ABNORMAL /HPF
BASOPHILS # BLD: 0 K/UL (ref 0–0.1)
BASOPHILS NFR BLD: 0 % (ref 0–1)
BILIRUB SERPL-MCNC: 0.8 MG/DL (ref 0.2–1)
BILIRUB UR QL: NEGATIVE
BUN SERPL-MCNC: 16 MG/DL (ref 6–20)
BUN/CREAT SERPL: 15 (ref 12–20)
CALCIUM SERPL-MCNC: 9.1 MG/DL (ref 8.5–10.1)
CHLORIDE SERPL-SCNC: 106 MMOL/L (ref 97–108)
CO2 SERPL-SCNC: 29 MMOL/L (ref 21–32)
COLOR UR: ABNORMAL
COMMENT, HOLDF: NORMAL
CREAT SERPL-MCNC: 1.08 MG/DL (ref 0.7–1.3)
DIFFERENTIAL METHOD BLD: ABNORMAL
EOSINOPHIL # BLD: 0 K/UL (ref 0–0.4)
EOSINOPHIL NFR BLD: 0 % (ref 0–7)
EPITH CASTS URNS QL MICRO: ABNORMAL /LPF
ERYTHROCYTE [DISTWIDTH] IN BLOOD BY AUTOMATED COUNT: 12.2 % (ref 11.5–14.5)
GLOBULIN SER CALC-MCNC: 3.1 G/DL (ref 2–4)
GLUCOSE SERPL-MCNC: 118 MG/DL (ref 65–100)
GLUCOSE UR STRIP.AUTO-MCNC: NEGATIVE MG/DL
HCT VFR BLD AUTO: 45.4 % (ref 36.6–50.3)
HGB BLD-MCNC: 15 G/DL (ref 12.1–17)
HGB UR QL STRIP: NEGATIVE
IMM GRANULOCYTES # BLD AUTO: 0 K/UL (ref 0–0.04)
IMM GRANULOCYTES NFR BLD AUTO: 0 % (ref 0–0.5)
KETONES UR QL STRIP.AUTO: 15 MG/DL
LEUKOCYTE ESTERASE UR QL STRIP.AUTO: NEGATIVE
LIPASE SERPL-CCNC: 98 U/L (ref 73–393)
LYMPHOCYTES # BLD: 0.9 K/UL (ref 0.8–3.5)
LYMPHOCYTES NFR BLD: 13 % (ref 12–49)
MCH RBC QN AUTO: 29.9 PG (ref 26–34)
MCHC RBC AUTO-ENTMCNC: 33 G/DL (ref 30–36.5)
MCV RBC AUTO: 90.4 FL (ref 80–99)
MONOCYTES # BLD: 0.3 K/UL (ref 0–1)
MONOCYTES NFR BLD: 4 % (ref 5–13)
MUCOUS THREADS URNS QL MICRO: ABNORMAL /LPF
NEUTS SEG # BLD: 5.9 K/UL (ref 1.8–8)
NEUTS SEG NFR BLD: 83 % (ref 32–75)
NITRITE UR QL STRIP.AUTO: NEGATIVE
NRBC # BLD: 0 K/UL (ref 0–0.01)
NRBC BLD-RTO: 0 PER 100 WBC
PH UR STRIP: 7 [PH] (ref 5–8)
PLATELET # BLD AUTO: 204 K/UL (ref 150–400)
PMV BLD AUTO: 11 FL (ref 8.9–12.9)
POTASSIUM SERPL-SCNC: 4.4 MMOL/L (ref 3.5–5.1)
PROT SERPL-MCNC: 7.4 G/DL (ref 6.4–8.2)
PROT UR STRIP-MCNC: NEGATIVE MG/DL
RBC # BLD AUTO: 5.02 M/UL (ref 4.1–5.7)
RBC #/AREA URNS HPF: ABNORMAL /HPF (ref 0–5)
SAMPLES BEING HELD,HOLD: NORMAL
SODIUM SERPL-SCNC: 139 MMOL/L (ref 136–145)
SP GR UR REFRACTOMETRY: 1.03 (ref 1–1.03)
UR CULT HOLD, URHOLD: NORMAL
UROBILINOGEN UR QL STRIP.AUTO: 1 EU/DL (ref 0.2–1)
WBC # BLD AUTO: 7.3 K/UL (ref 4.1–11.1)
WBC URNS QL MICRO: ABNORMAL /HPF (ref 0–4)

## 2021-05-18 PROCEDURE — 96375 TX/PRO/DX INJ NEW DRUG ADDON: CPT

## 2021-05-18 PROCEDURE — 81001 URINALYSIS AUTO W/SCOPE: CPT

## 2021-05-18 PROCEDURE — 99283 EMERGENCY DEPT VISIT LOW MDM: CPT

## 2021-05-18 PROCEDURE — 83690 ASSAY OF LIPASE: CPT

## 2021-05-18 PROCEDURE — 74176 CT ABD & PELVIS W/O CONTRAST: CPT

## 2021-05-18 PROCEDURE — 74011250636 HC RX REV CODE- 250/636: Performed by: PHYSICIAN ASSISTANT

## 2021-05-18 PROCEDURE — 85025 COMPLETE CBC W/AUTO DIFF WBC: CPT

## 2021-05-18 PROCEDURE — 80053 COMPREHEN METABOLIC PANEL: CPT

## 2021-05-18 PROCEDURE — 96374 THER/PROPH/DIAG INJ IV PUSH: CPT

## 2021-05-18 PROCEDURE — 36415 COLL VENOUS BLD VENIPUNCTURE: CPT

## 2021-05-18 PROCEDURE — 96361 HYDRATE IV INFUSION ADD-ON: CPT

## 2021-05-18 RX ORDER — ONDANSETRON 4 MG/1
4 TABLET, ORALLY DISINTEGRATING ORAL
Qty: 20 TAB | Refills: 0 | Status: SHIPPED | OUTPATIENT
Start: 2021-05-18

## 2021-05-18 RX ORDER — ONDANSETRON 2 MG/ML
4 INJECTION INTRAMUSCULAR; INTRAVENOUS
Status: COMPLETED | OUTPATIENT
Start: 2021-05-18 | End: 2021-05-18

## 2021-05-18 RX ORDER — CYCLOBENZAPRINE HCL 5 MG
5-10 TABLET ORAL
Qty: 15 TAB | Refills: 0 | Status: SHIPPED | OUTPATIENT
Start: 2021-05-18

## 2021-05-18 RX ORDER — LIDOCAINE 4 G/100G
PATCH TOPICAL
Qty: 1 PACKAGE | Refills: 0 | Status: SHIPPED | OUTPATIENT
Start: 2021-05-18

## 2021-05-18 RX ORDER — NAPROXEN 500 MG/1
500 TABLET ORAL 2 TIMES DAILY WITH MEALS
Qty: 20 TAB | Refills: 0 | Status: SHIPPED | OUTPATIENT
Start: 2021-05-18 | End: 2021-05-28

## 2021-05-18 RX ORDER — KETOROLAC TROMETHAMINE 30 MG/ML
30 INJECTION, SOLUTION INTRAMUSCULAR; INTRAVENOUS
Status: COMPLETED | OUTPATIENT
Start: 2021-05-18 | End: 2021-05-18

## 2021-05-18 RX ADMIN — KETOROLAC TROMETHAMINE 30 MG: 30 INJECTION, SOLUTION INTRAMUSCULAR; INTRAVENOUS at 09:33

## 2021-05-18 RX ADMIN — ONDANSETRON 4 MG: 2 INJECTION INTRAMUSCULAR; INTRAVENOUS at 09:33

## 2021-05-18 RX ADMIN — SODIUM CHLORIDE 1000 ML: 9 INJECTION, SOLUTION INTRAVENOUS at 09:33

## 2021-05-18 NOTE — ED TRIAGE NOTES
Pt c/o left lower back pain, denies radiating pain, denies injury, pt stated he wakes up every morning and vomits , denies urinary symsptoms

## 2021-05-18 NOTE — ED PROVIDER NOTES
55-year-old male with past medical history of gastroesophageal reflux disease presents emergency department due to left-sided back pain, nausea, and vomiting that began 3 days ago, Saturday morning. Patient awoke with left-sided back pain nausea, and vomiting on that day. The following 2 days his symptoms have resolved, and this morning he awoke with the back pain, nausea and vomiting present again. States that certain twisting movements seem to worsen the back pain. Denies any fever, chills, abdominal pain, dysuria, hematuria. Denies a history of similar back pain in the past.  Denies any recent injury. Has no numbness or weakness of the legs. Has a history of nausea and vomiting difficulties a few years ago but has not had this problem recently. Takes Nexium regularly, no other daily medications. Past Medical History:   Diagnosis Date    GERD (gastroesophageal reflux disease)        No past surgical history on file. History reviewed. No pertinent family history.     Social History     Socioeconomic History    Marital status:      Spouse name: Not on file    Number of children: Not on file    Years of education: Not on file    Highest education level: Not on file   Occupational History    Not on file   Social Needs    Financial resource strain: Not on file    Food insecurity     Worry: Not on file     Inability: Not on file    Transportation needs     Medical: Not on file     Non-medical: Not on file   Tobacco Use    Smoking status: Never Smoker   Substance and Sexual Activity    Alcohol use: Yes     Comment: socially    Drug use: No    Sexual activity: Yes     Partners: Female   Lifestyle    Physical activity     Days per week: Not on file     Minutes per session: Not on file    Stress: Not on file   Relationships    Social connections     Talks on phone: Not on file     Gets together: Not on file     Attends Hinduism service: Not on file     Active member of club or organization: Not on file     Attends meetings of clubs or organizations: Not on file     Relationship status: Not on file    Intimate partner violence     Fear of current or ex partner: Not on file     Emotionally abused: Not on file     Physically abused: Not on file     Forced sexual activity: Not on file   Other Topics Concern    Not on file   Social History Narrative    Not on file         ALLERGIES: Patient has no known allergies. Review of Systems   Constitutional: Negative for fever. HENT: Negative for congestion and sore throat. Respiratory: Negative for cough and shortness of breath. Cardiovascular: Negative for chest pain. Gastrointestinal: Positive for nausea and vomiting. Negative for constipation and diarrhea. Genitourinary: Positive for flank pain (L). Negative for dysuria, hematuria and urgency. Musculoskeletal: Positive for back pain. Negative for gait problem, myalgias and neck pain. Skin: Negative for rash. Neurological: Negative for dizziness, weakness and numbness. Vitals:    05/18/21 0804   BP: (!) 156/110   Pulse: (!) 59   Resp: 16   Temp: 98 °F (36.7 °C)   SpO2: 99%            Physical Exam  Vitals signs and nursing note reviewed. Constitutional:       General: He is not in acute distress. HENT:      Head: Normocephalic. Nose: Nose normal.      Mouth/Throat:      Mouth: Mucous membranes are moist.   Eyes:      Extraocular Movements: Extraocular movements intact. Neck:      Musculoskeletal: Normal range of motion. Pulmonary:      Effort: Pulmonary effort is normal. No respiratory distress. Abdominal:      General: Abdomen is protuberant. Bowel sounds are normal.      Palpations: Abdomen is soft. Tenderness: There is left CVA tenderness. There is no right CVA tenderness.    Musculoskeletal:      Cervical back: Normal.      Lumbar back: Normal.        Back:       Comments: Mild left mid-back tenderness without deformity   Skin:     General: Skin is dry.      Findings: No rash. Neurological:      Mental Status: He is alert and oriented to person, place, and time. Psychiatric:         Mood and Affect: Mood normal.       Medications   sodium chloride 0.9 % bolus infusion 1,000 mL (1,000 mL IntraVENous New Bag 5/18/21 0933)   ondansetron (ZOFRAN) injection 4 mg (4 mg IntraVENous Given 5/18/21 0933)   ketorolac (TORADOL) injection 30 mg (30 mg IntraVENous Given 5/18/21 0933)     Labs Reviewed   CBC WITH AUTOMATED DIFF - Abnormal; Notable for the following components:       Result Value    NEUTROPHILS 83 (*)     MONOCYTES 4 (*)     All other components within normal limits   METABOLIC PANEL, COMPREHENSIVE - Abnormal; Notable for the following components:    Anion gap 4 (*)     Glucose 118 (*)     All other components within normal limits   URINALYSIS W/MICROSCOPIC - Abnormal; Notable for the following components:    Appearance CLOUDY (*)     Ketone 15 (*)     Bacteria 1+ (*)     Mucus TRACE (*)     All other components within normal limits   URINE CULTURE HOLD SAMPLE   LIPASE   SAMPLES BEING HELD     CT ABD PELV WO CONT   Final Result   1. No evidence of renal or ureteral calculus or urinary tract obstruction. 2. No evidence of acute abdominal or pelvic process. MDM  Number of Diagnoses or Management Options  Acute left-sided thoracic back pain  Non-intractable vomiting with nausea, unspecified vomiting type  Diagnosis management comments: Differential diagnosis includes musculoskeletal pain, pyelonephritis, kidney stone, and others    Pain, nausea, and vomiting improved in ED with medications. Patient tolerating p.o. at this time. CT of the abdomen pelvis reveals no acute process. Urinalysis unremarkable for acute infection. Labs otherwise unremarkable. Suspect musculoskeletal pain. Patient has significant history of nausea and vomiting, suspect this is a long-term problem triggered by pain.   Plan to discharge patient with prescriptions for Zofran, Naprosyn, Flexeril, and lidocaine patches with follow-up with orthospine as well as primary care referrals.   Discussed return precautions including intolerable pain and inability to eat and drink        Amount and/or Complexity of Data Reviewed  Clinical lab tests: reviewed  Tests in the radiology section of CPT®: reviewed           Procedures        Radha Navarro PA-C  5/18/2021

## 2024-09-02 ENCOUNTER — HOSPITAL ENCOUNTER (EMERGENCY)
Facility: HOSPITAL | Age: 49
Discharge: HOME | End: 2024-09-02
Attending: EMERGENCY MEDICINE

## 2024-09-02 ENCOUNTER — APPOINTMENT (OUTPATIENT)
Facility: HOSPITAL | Age: 49
End: 2024-09-02

## 2024-09-02 VITALS
HEIGHT: 72 IN | WEIGHT: 236.55 LBS | DIASTOLIC BLOOD PRESSURE: 95 MMHG | SYSTOLIC BLOOD PRESSURE: 165 MMHG | HEART RATE: 52 BPM | RESPIRATION RATE: 14 BRPM | BODY MASS INDEX: 32.04 KG/M2 | TEMPERATURE: 98.2 F | OXYGEN SATURATION: 93 %

## 2024-09-02 DIAGNOSIS — K29.00 ACUTE GASTRITIS WITHOUT HEMORRHAGE, UNSPECIFIED GASTRITIS TYPE: Primary | ICD-10-CM

## 2024-09-02 LAB
ALBUMIN SERPL-MCNC: 3.8 G/DL (ref 3.5–5)
ALBUMIN/GLOB SERPL: 1.2 (ref 1.1–2.2)
ALP SERPL-CCNC: 72 U/L (ref 45–117)
ALT SERPL-CCNC: 51 U/L (ref 12–78)
ANION GAP SERPL CALC-SCNC: 6 MMOL/L (ref 5–15)
APPEARANCE UR: CLEAR
AST SERPL-CCNC: 23 U/L (ref 15–37)
BACTERIA URNS QL MICRO: NEGATIVE /HPF
BASOPHILS # BLD: 0.1 K/UL (ref 0–0.1)
BASOPHILS NFR BLD: 1 % (ref 0–1)
BILIRUB SERPL-MCNC: 0.7 MG/DL (ref 0.2–1)
BILIRUB UR QL: NEGATIVE
BUN SERPL-MCNC: 17 MG/DL (ref 6–20)
BUN/CREAT SERPL: 14 (ref 12–20)
CALCIUM SERPL-MCNC: 8.5 MG/DL (ref 8.5–10.1)
CHLORIDE SERPL-SCNC: 107 MMOL/L (ref 97–108)
CO2 SERPL-SCNC: 25 MMOL/L (ref 21–32)
COLOR UR: NORMAL
CREAT SERPL-MCNC: 1.19 MG/DL (ref 0.7–1.3)
DIFFERENTIAL METHOD BLD: ABNORMAL
EOSINOPHIL # BLD: 0.1 K/UL (ref 0–0.4)
EOSINOPHIL NFR BLD: 2 % (ref 0–7)
EPITH CASTS URNS QL MICRO: NORMAL /LPF
ERYTHROCYTE [DISTWIDTH] IN BLOOD BY AUTOMATED COUNT: 12.1 % (ref 11.5–14.5)
GLOBULIN SER CALC-MCNC: 3.2 G/DL (ref 2–4)
GLUCOSE SERPL-MCNC: 123 MG/DL (ref 65–100)
GLUCOSE UR STRIP.AUTO-MCNC: NEGATIVE MG/DL
HCT VFR BLD AUTO: 43.8 % (ref 36.6–50.3)
HGB BLD-MCNC: 14.8 G/DL (ref 12.1–17)
HGB UR QL STRIP: NEGATIVE
HYALINE CASTS URNS QL MICRO: NORMAL /LPF (ref 0–2)
IMM GRANULOCYTES # BLD AUTO: 0 K/UL (ref 0–0.04)
IMM GRANULOCYTES NFR BLD AUTO: 0 % (ref 0–0.5)
KETONES UR QL STRIP.AUTO: NEGATIVE MG/DL
LEUKOCYTE ESTERASE UR QL STRIP.AUTO: NEGATIVE
LIPASE SERPL-CCNC: 28 U/L (ref 13–75)
LYMPHOCYTES # BLD: 1 K/UL (ref 0.8–3.5)
LYMPHOCYTES NFR BLD: 12 % (ref 12–49)
MCH RBC QN AUTO: 30.1 PG (ref 26–34)
MCHC RBC AUTO-ENTMCNC: 33.8 G/DL (ref 30–36.5)
MCV RBC AUTO: 89 FL (ref 80–99)
MONOCYTES # BLD: 0.4 K/UL (ref 0–1)
MONOCYTES NFR BLD: 5 % (ref 5–13)
NEUTS SEG # BLD: 6.5 K/UL (ref 1.8–8)
NEUTS SEG NFR BLD: 80 % (ref 32–75)
NITRITE UR QL STRIP.AUTO: NEGATIVE
NRBC # BLD: 0 K/UL (ref 0–0.01)
NRBC BLD-RTO: 0 PER 100 WBC
PH UR STRIP: 7.5 (ref 5–8)
PLATELET # BLD AUTO: 229 K/UL (ref 150–400)
PMV BLD AUTO: 10.9 FL (ref 8.9–12.9)
POTASSIUM SERPL-SCNC: 4 MMOL/L (ref 3.5–5.1)
PROT SERPL-MCNC: 7 G/DL (ref 6.4–8.2)
PROT UR STRIP-MCNC: NEGATIVE MG/DL
RBC # BLD AUTO: 4.92 M/UL (ref 4.1–5.7)
RBC #/AREA URNS HPF: NORMAL /HPF (ref 0–5)
SODIUM SERPL-SCNC: 138 MMOL/L (ref 136–145)
SP GR UR REFRACTOMETRY: 1.01 (ref 1–1.03)
TROPONIN I SERPL HS-MCNC: 6 NG/L (ref 0–76)
URINE CULTURE IF INDICATED: NORMAL
UROBILINOGEN UR QL STRIP.AUTO: 1 EU/DL (ref 0.2–1)
WBC # BLD AUTO: 8.2 K/UL (ref 4.1–11.1)
WBC URNS QL MICRO: NORMAL /HPF (ref 0–4)

## 2024-09-02 PROCEDURE — 6360000004 HC RX CONTRAST MEDICATION: Performed by: EMERGENCY MEDICINE

## 2024-09-02 PROCEDURE — 96374 THER/PROPH/DIAG INJ IV PUSH: CPT

## 2024-09-02 PROCEDURE — 93005 ELECTROCARDIOGRAM TRACING: CPT | Performed by: EMERGENCY MEDICINE

## 2024-09-02 PROCEDURE — 74177 CT ABD & PELVIS W/CONTRAST: CPT

## 2024-09-02 PROCEDURE — 36415 COLL VENOUS BLD VENIPUNCTURE: CPT

## 2024-09-02 PROCEDURE — 6360000002 HC RX W HCPCS: Performed by: EMERGENCY MEDICINE

## 2024-09-02 PROCEDURE — 99285 EMERGENCY DEPT VISIT HI MDM: CPT

## 2024-09-02 PROCEDURE — 81001 URINALYSIS AUTO W/SCOPE: CPT

## 2024-09-02 PROCEDURE — 83690 ASSAY OF LIPASE: CPT

## 2024-09-02 PROCEDURE — 96375 TX/PRO/DX INJ NEW DRUG ADDON: CPT

## 2024-09-02 PROCEDURE — 80053 COMPREHEN METABOLIC PANEL: CPT

## 2024-09-02 PROCEDURE — 96361 HYDRATE IV INFUSION ADD-ON: CPT

## 2024-09-02 PROCEDURE — 84484 ASSAY OF TROPONIN QUANT: CPT

## 2024-09-02 PROCEDURE — 85025 COMPLETE CBC W/AUTO DIFF WBC: CPT

## 2024-09-02 PROCEDURE — 2580000003 HC RX 258: Performed by: EMERGENCY MEDICINE

## 2024-09-02 RX ORDER — ONDANSETRON 2 MG/ML
4 INJECTION INTRAMUSCULAR; INTRAVENOUS ONCE
Status: COMPLETED | OUTPATIENT
Start: 2024-09-02 | End: 2024-09-02

## 2024-09-02 RX ORDER — SUCRALFATE 1 G/1
1 TABLET ORAL 4 TIMES DAILY
Qty: 40 TABLET | Refills: 0 | Status: SHIPPED | OUTPATIENT
Start: 2024-09-02

## 2024-09-02 RX ORDER — 0.9 % SODIUM CHLORIDE 0.9 %
1000 INTRAVENOUS SOLUTION INTRAVENOUS ONCE
Status: COMPLETED | OUTPATIENT
Start: 2024-09-02 | End: 2024-09-02

## 2024-09-02 RX ORDER — TRAZODONE HYDROCHLORIDE 50 MG/1
50 TABLET, FILM COATED ORAL NIGHTLY
Qty: 10 TABLET | Refills: 0 | Status: SHIPPED | OUTPATIENT
Start: 2024-09-02

## 2024-09-02 RX ORDER — PANTOPRAZOLE SODIUM 40 MG/1
40 TABLET, DELAYED RELEASE ORAL
Qty: 30 TABLET | Refills: 3 | Status: SHIPPED | OUTPATIENT
Start: 2024-09-02

## 2024-09-02 RX ORDER — IOPAMIDOL 755 MG/ML
100 INJECTION, SOLUTION INTRAVASCULAR
Status: COMPLETED | OUTPATIENT
Start: 2024-09-02 | End: 2024-09-02

## 2024-09-02 RX ORDER — ONDANSETRON 4 MG/1
4 TABLET, ORALLY DISINTEGRATING ORAL 3 TIMES DAILY PRN
Qty: 21 TABLET | Refills: 0 | Status: SHIPPED | OUTPATIENT
Start: 2024-09-02

## 2024-09-02 RX ADMIN — IOPAMIDOL 100 ML: 755 INJECTION, SOLUTION INTRAVENOUS at 10:04

## 2024-09-02 RX ADMIN — SODIUM CHLORIDE 1000 ML: 9 INJECTION, SOLUTION INTRAVENOUS at 09:58

## 2024-09-02 RX ADMIN — ONDANSETRON 4 MG: 2 INJECTION INTRAMUSCULAR; INTRAVENOUS at 09:51

## 2024-09-02 RX ADMIN — PANTOPRAZOLE SODIUM 40 MG: 40 INJECTION, POWDER, FOR SOLUTION INTRAVENOUS at 10:15

## 2024-09-02 ASSESSMENT — PAIN SCALES - GENERAL: PAINLEVEL_OUTOF10: 0

## 2024-09-02 ASSESSMENT — PAIN - FUNCTIONAL ASSESSMENT: PAIN_FUNCTIONAL_ASSESSMENT: 0-10

## 2024-09-02 NOTE — ED PROVIDER NOTES
\[facility]\"" EMERGENCY DEPT  EMERGENCY DEPARTMENT ENCOUNTER       Pt Name: Ulises Kaplan Jr.  MRN: [id number]  Birthdate 1975  Date of evaluation: 9/2/2024  Provider: Elton Campos DO   PCP: No primary care provider on file.  Note Started: 1:35 PM EDT 9/2/24     CHIEF COMPLAINT       Chief Complaint   Patient presents with    Emesis    Abdominal Pain        HISTORY OF PRESENT ILLNESS: 1 or more elements      History From: Patient, History limited by: none     Ulises Kaplan Jr. is a 49 y.o. male presents to the emergency department by private vehicle for evaluation of nausea vomiting abdominal pain.       Please See MDM for Additional Details of the HPI/PMH  Nursing Notes were all reviewed and agreed with or any disagreements were addressed in the HPI.     REVIEW OF SYSTEMS        Positives and Pertinent negatives as per HPI.    PAST HISTORY     Past Medical History:  Past Medical History:   Diagnosis Date    GERD (gastroesophageal reflux disease)        Past Surgical History:  Past Surgical History:   Procedure Laterality Date    CHOLECYSTECTOMY         Family History:  No family history on file.    Social History:  Social History     Tobacco Use    Smoking status: Never   Substance Use Topics    Alcohol use: Yes    Drug use: No       Allergies:  No Known Allergies    CURRENT MEDICATIONS      Discharge Medication List as of 9/2/2024 12:07 PM          SCREENINGS               No data recorded         PHYSICAL EXAM      ED Triage Vitals [09/02/24 0927]   Encounter Vitals Group      BP (!) 191/103      Systolic BP Percentile       Diastolic BP Percentile       Pulse 50      Respirations 20      Temp 98.2 °F (36.8 °C)      Temp Source Oral      SpO2 100 %      Weight - Scale 107.3 kg (236 lb 8.9 oz)      Height 1.829 m (6')      Head Circumference       Peak Flow       Pain Score       Pain Loc       Pain Education       Exclude from Growth Chart         Physical Exam  Vitals and nursing note reviewed.   Constitutional:

## 2024-09-04 LAB
EKG ATRIAL RATE: 45 BPM
EKG DIAGNOSIS: NORMAL
EKG P AXIS: 102 DEGREES
EKG P-R INTERVAL: 148 MS
EKG Q-T INTERVAL: 438 MS
EKG QRS DURATION: 94 MS
EKG QTC CALCULATION (BAZETT): 378 MS
EKG R AXIS: -7 DEGREES
EKG T AXIS: 25 DEGREES
EKG VENTRICULAR RATE: 45 BPM